# Patient Record
Sex: FEMALE | Race: WHITE | NOT HISPANIC OR LATINO
[De-identification: names, ages, dates, MRNs, and addresses within clinical notes are randomized per-mention and may not be internally consistent; named-entity substitution may affect disease eponyms.]

---

## 2017-08-09 ENCOUNTER — RESULT REVIEW (OUTPATIENT)
Age: 57
End: 2017-08-09

## 2018-10-18 ENCOUNTER — RESULT REVIEW (OUTPATIENT)
Age: 58
End: 2018-10-18

## 2019-12-02 ENCOUNTER — RESULT REVIEW (OUTPATIENT)
Age: 59
End: 2019-12-02

## 2020-02-12 ENCOUNTER — RESULT REVIEW (OUTPATIENT)
Age: 60
End: 2020-02-12

## 2020-11-02 ENCOUNTER — RESULT REVIEW (OUTPATIENT)
Age: 60
End: 2020-11-02

## 2021-12-27 ENCOUNTER — APPOINTMENT (OUTPATIENT)
Dept: ULTRASOUND IMAGING | Facility: CLINIC | Age: 61
End: 2021-12-27
Payer: COMMERCIAL

## 2021-12-27 ENCOUNTER — OUTPATIENT (OUTPATIENT)
Dept: OUTPATIENT SERVICES | Facility: HOSPITAL | Age: 61
LOS: 1 days | End: 2021-12-27
Payer: COMMERCIAL

## 2021-12-27 DIAGNOSIS — Z00.8 ENCOUNTER FOR OTHER GENERAL EXAMINATION: ICD-10-CM

## 2021-12-27 PROCEDURE — 76831 ECHO EXAM UTERUS: CPT | Mod: 26

## 2021-12-27 PROCEDURE — 58340 CATHETER FOR HYSTEROGRAPHY: CPT

## 2021-12-27 PROCEDURE — 76831 ECHO EXAM UTERUS: CPT

## 2022-01-04 ENCOUNTER — APPOINTMENT (OUTPATIENT)
Dept: ULTRASOUND IMAGING | Facility: CLINIC | Age: 62
End: 2022-01-04

## 2022-06-15 ENCOUNTER — RESULT REVIEW (OUTPATIENT)
Age: 62
End: 2022-06-15

## 2022-07-21 ENCOUNTER — APPOINTMENT (OUTPATIENT)
Dept: ULTRASOUND IMAGING | Facility: CLINIC | Age: 62
End: 2022-07-21

## 2022-07-21 PROCEDURE — 76830 TRANSVAGINAL US NON-OB: CPT

## 2022-07-21 PROCEDURE — 76856 US EXAM PELVIC COMPLETE: CPT

## 2022-08-25 ENCOUNTER — APPOINTMENT (OUTPATIENT)
Dept: OBGYN | Facility: CLINIC | Age: 62
End: 2022-08-25

## 2022-08-25 VITALS
SYSTOLIC BLOOD PRESSURE: 98 MMHG | HEIGHT: 63 IN | WEIGHT: 100 LBS | DIASTOLIC BLOOD PRESSURE: 62 MMHG | BODY MASS INDEX: 17.72 KG/M2

## 2022-08-25 PROCEDURE — 99203 OFFICE O/P NEW LOW 30 MIN: CPT

## 2022-08-30 NOTE — PLAN
[FreeTextEntry1] : Discussed the surgical management of endometrial polyps at length with patient.  Risks/benefits and alternatives of operative hysteroscopy and polyp resection were reviewed including but not limited to risk of infection, hemorrhage and perforation with concomittant risks of injury to abdominal organs (bladder, bowel, gyn and vascular structures).  Remote risk of fluid overload reported.   Discussed the need to determine pathology of polyp to r/o malignant potential.\par To schedule d/c operative hysteroscopy.\par During this visit 45 minutes were spent face-to-face with greater than 50% of the time dedicated to counseling.\par

## 2022-08-30 NOTE — HISTORY OF PRESENT ILLNESS
[FreeTextEntry1] : 61yo  PM woman who initially presented to Dr. Reynaga with sporadic episodes of spotting being referred for polypectomy. She thinks that it is related to missing a dose of Prempro. Is sexually active with her  and does not notice bleeding after. Denies dysuria or issues with BM. Has has multiple endometrial biopsies since . Has been followed for this endometrial polyp. She is happy on Prempro and would like to continue. She is unsure if she would like to go through a procedure to have polyp removed.\par \par ObHx: 8 , uncomp\par GynHx: Was recently diagnosed with polyp, denies history\par PMSHx: RA \par HCM: pap smears wnl with Dr. Reynaga,is going to have colonoscopy this year, is up to date on mammogram.\par Meds: Prempro (has been on it for >2 years),Methotrexate for RA\par All: NKDA \par Social: work as psychologist, lives with \par \par TVUS (22):\par FINDINGS:\par Uterus: 6.4 cm x 4.2 cm x 5.0 cm. No fibroids are seen\par Endometrium: 7 mm. There is a polyp measuring up to 9 mm\par \par Right ovary: 1.5 cm x 1.0 cm x 1.3 cm. Within normal limits.\par Left ovary: 1.9 cm x 0.9 cm x 0.8 cm. Within normal limits.\par \par Fluid: None.\par \par IMPRESSION:\par Stable endometrial polyp.\par \par --- End of Report ---\par  [TextBox_4] : Consult regarding cervical polyps [PapSmeardate] : 6/2022 [LMPDate] : menopause 52

## 2023-06-22 ENCOUNTER — EMERGENCY (EMERGENCY)
Facility: HOSPITAL | Age: 63
LOS: 1 days | Discharge: HOME | End: 2023-06-22
Attending: EMERGENCY MEDICINE
Payer: COMMERCIAL

## 2023-06-22 VITALS
WEIGHT: 98.99 LBS | SYSTOLIC BLOOD PRESSURE: 129 MMHG | HEART RATE: 59 BPM | DIASTOLIC BLOOD PRESSURE: 70 MMHG | HEIGHT: 65 IN | TEMPERATURE: 97 F | RESPIRATION RATE: 17 BRPM | OXYGEN SATURATION: 98 %

## 2023-06-22 VITALS
TEMPERATURE: 98 F | DIASTOLIC BLOOD PRESSURE: 52 MMHG | OXYGEN SATURATION: 100 % | HEART RATE: 65 BPM | RESPIRATION RATE: 19 BRPM | SYSTOLIC BLOOD PRESSURE: 102 MMHG

## 2023-06-22 LAB
ALBUMIN SERPL ELPH-MCNC: 4.1 G/DL — SIGNIFICANT CHANGE UP (ref 3.3–5)
ALP SERPL-CCNC: 65 U/L — SIGNIFICANT CHANGE UP (ref 40–120)
ALT FLD-CCNC: 26 U/L — SIGNIFICANT CHANGE UP (ref 10–45)
APTT BLD: 27.6 SEC — SIGNIFICANT CHANGE UP (ref 27.5–35.5)
AST SERPL-CCNC: 31 U/L — SIGNIFICANT CHANGE UP (ref 10–40)
BASOPHILS # BLD AUTO: 0.03 K/UL — SIGNIFICANT CHANGE UP (ref 0–0.2)
BASOPHILS NFR BLD AUTO: 0.5 % — SIGNIFICANT CHANGE UP (ref 0–2)
BILIRUB SERPL-MCNC: 0.4 MG/DL — SIGNIFICANT CHANGE UP (ref 0.2–1.2)
BUN SERPL-MCNC: 16 MG/DL — SIGNIFICANT CHANGE UP (ref 7–23)
CALCIUM SERPL-MCNC: 9.4 MG/DL — SIGNIFICANT CHANGE UP (ref 8.4–10.5)
CHLORIDE SERPL-SCNC: 104 MMOL/L — SIGNIFICANT CHANGE UP (ref 96–108)
CO2 SERPL-SCNC: 19 MMOL/L — LOW (ref 22–31)
CREAT SERPL-MCNC: 0.91 MG/DL — SIGNIFICANT CHANGE UP (ref 0.5–1.3)
EGFR: 71 ML/MIN/1.73M2 — SIGNIFICANT CHANGE UP
EOSINOPHIL # BLD AUTO: 0.06 K/UL — SIGNIFICANT CHANGE UP (ref 0–0.5)
EOSINOPHIL NFR BLD AUTO: 0.9 % — SIGNIFICANT CHANGE UP (ref 0–6)
GLUCOSE SERPL-MCNC: 94 MG/DL — SIGNIFICANT CHANGE UP (ref 70–99)
HCT VFR BLD CALC: 38.7 % — SIGNIFICANT CHANGE UP (ref 34.5–45)
HGB BLD-MCNC: 12 G/DL — SIGNIFICANT CHANGE UP (ref 11.5–15.5)
IMM GRANULOCYTES NFR BLD AUTO: 0.5 % — SIGNIFICANT CHANGE UP (ref 0–0.9)
INR BLD: 0.95 RATIO — SIGNIFICANT CHANGE UP (ref 0.88–1.16)
LYMPHOCYTES # BLD AUTO: 1.52 K/UL — SIGNIFICANT CHANGE UP (ref 1–3.3)
LYMPHOCYTES # BLD AUTO: 23.5 % — SIGNIFICANT CHANGE UP (ref 13–44)
MCHC RBC-ENTMCNC: 28.3 PG — SIGNIFICANT CHANGE UP (ref 27–34)
MCHC RBC-ENTMCNC: 31 GM/DL — LOW (ref 32–36)
MCV RBC AUTO: 91.3 FL — SIGNIFICANT CHANGE UP (ref 80–100)
MONOCYTES # BLD AUTO: 0.43 K/UL — SIGNIFICANT CHANGE UP (ref 0–0.9)
MONOCYTES NFR BLD AUTO: 6.7 % — SIGNIFICANT CHANGE UP (ref 2–14)
NEUTROPHILS # BLD AUTO: 4.39 K/UL — SIGNIFICANT CHANGE UP (ref 1.8–7.4)
NEUTROPHILS NFR BLD AUTO: 67.9 % — SIGNIFICANT CHANGE UP (ref 43–77)
NRBC # BLD: 0 /100 WBCS — SIGNIFICANT CHANGE UP (ref 0–0)
PLATELET # BLD AUTO: 234 K/UL — SIGNIFICANT CHANGE UP (ref 150–400)
POTASSIUM SERPL-MCNC: 4.6 MMOL/L — SIGNIFICANT CHANGE UP (ref 3.5–5.3)
POTASSIUM SERPL-SCNC: 4.6 MMOL/L — SIGNIFICANT CHANGE UP (ref 3.5–5.3)
PROT SERPL-MCNC: 7 G/DL — SIGNIFICANT CHANGE UP (ref 6–8.3)
PROTHROM AB SERPL-ACNC: 10.9 SEC — SIGNIFICANT CHANGE UP (ref 10.5–13.4)
RBC # BLD: 4.24 M/UL — SIGNIFICANT CHANGE UP (ref 3.8–5.2)
RBC # FLD: 15.9 % — HIGH (ref 10.3–14.5)
SODIUM SERPL-SCNC: 134 MMOL/L — LOW (ref 135–145)
TROPONIN T, HIGH SENSITIVITY RESULT: <6 NG/L — SIGNIFICANT CHANGE UP (ref 0–51)
WBC # BLD: 6.46 K/UL — SIGNIFICANT CHANGE UP (ref 3.8–10.5)
WBC # FLD AUTO: 6.46 K/UL — SIGNIFICANT CHANGE UP (ref 3.8–10.5)

## 2023-06-22 PROCEDURE — 82330 ASSAY OF CALCIUM: CPT

## 2023-06-22 PROCEDURE — 84132 ASSAY OF SERUM POTASSIUM: CPT

## 2023-06-22 PROCEDURE — 70498 CT ANGIOGRAPHY NECK: CPT | Mod: 26,MA

## 2023-06-22 PROCEDURE — 70496 CT ANGIOGRAPHY HEAD: CPT | Mod: 26,MA

## 2023-06-22 PROCEDURE — 99291 CRITICAL CARE FIRST HOUR: CPT | Mod: 25

## 2023-06-22 PROCEDURE — 84295 ASSAY OF SERUM SODIUM: CPT

## 2023-06-22 PROCEDURE — 99291 CRITICAL CARE FIRST HOUR: CPT

## 2023-06-22 PROCEDURE — 84484 ASSAY OF TROPONIN QUANT: CPT

## 2023-06-22 PROCEDURE — 85025 COMPLETE CBC W/AUTO DIFF WBC: CPT

## 2023-06-22 PROCEDURE — 82962 GLUCOSE BLOOD TEST: CPT

## 2023-06-22 PROCEDURE — 82803 BLOOD GASES ANY COMBINATION: CPT

## 2023-06-22 PROCEDURE — 85018 HEMOGLOBIN: CPT

## 2023-06-22 PROCEDURE — 70498 CT ANGIOGRAPHY NECK: CPT | Mod: MA

## 2023-06-22 PROCEDURE — 85610 PROTHROMBIN TIME: CPT

## 2023-06-22 PROCEDURE — 82947 ASSAY GLUCOSE BLOOD QUANT: CPT

## 2023-06-22 PROCEDURE — 70450 CT HEAD/BRAIN W/O DYE: CPT | Mod: MA

## 2023-06-22 PROCEDURE — 85014 HEMATOCRIT: CPT

## 2023-06-22 PROCEDURE — 93005 ELECTROCARDIOGRAM TRACING: CPT

## 2023-06-22 PROCEDURE — 70496 CT ANGIOGRAPHY HEAD: CPT | Mod: MA

## 2023-06-22 PROCEDURE — 85730 THROMBOPLASTIN TIME PARTIAL: CPT

## 2023-06-22 PROCEDURE — 83605 ASSAY OF LACTIC ACID: CPT

## 2023-06-22 PROCEDURE — 80053 COMPREHEN METABOLIC PANEL: CPT

## 2023-06-22 PROCEDURE — 82435 ASSAY OF BLOOD CHLORIDE: CPT

## 2023-06-22 NOTE — CONSULT NOTE ADULT - SUBJECTIVE AND OBJECTIVE BOX
Neurology - Consult Note - 06-22-23  -  Jeremias Hernandez MD  PGY-3 Neurology  Spectra: [phone] ([facility]), [phone] ([facility])  -    Name: STEFANIA BRANDT; 63y (1960)    Reason for Admission:     Chief Complaint:     HPI:       Review of Systems:  INCOMPLETE   CONSTITUTIONAL: No fevers or chills  EYES/ENT: No visual changes or no throat pain   NECK: No pain or stiffness  RESPIRATORY: No hemoptysis or shortness of breath  CARDIOVASCULAR: No chest pain or palpitations  GASTROINTESTINAL: No melena or hematochezia  GENITOURINARY: No dysuria or hematuria  NEUROLOGICAL: +As stated in HPI above  SKIN: No itching, burning, rashes, or lesions   All other review of systems is negative unless indicated above.    Allergies:  No Known Allergies      PMHx:     PFHx:     PSuHx:       Medications:  MEDICATIONS  (STANDING):    MEDICATIONS  (PRN):      Vitals:  T(C): 36.7 (06-22-23 @ 13:25), Max: 36.7 (06-22-23 @ 13:25)  HR: 60 (06-22-23 @ 13:25) (59 - 60)  BP: 117/63 (06-22-23 @ 13:25) (117/63 - 129/70)  RR: 20 (06-22-23 @ 13:25) (17 - 20)  SpO2: 100% (06-22-23 @ 13:25) (98% - 100%)    Physical Examination:      Constitutional: well-developed, well-nourished, well-groomed  Eyes: ophthalmoscopic exam deferred secondary to COVID-19 pandemic  Cardiovascular: no swelling, warm-to-touch    Neurological Examination:    - Mental Status: Eyes open, attends to examiner; oriented to person, age, month, year, location, and situation; speech is fluent with intact naming, intact repetition, and follows 1-step and 3-step mid-line crossing commands; good overall fund of knowledge (aware of current events, relevant past history, and vocabulary appropriate for level of education); immediate recall is 3/3 words and delayed recall is 3/3 words at 5 minutes; able to spell WORLD backwards and recite the days of the week in reverse.    - Cranial Nerves:  II: Visual fields are full to confrontation; pupils are equal, round, and reactive to light   III, IV, VI: Extraocular movements are intact without nystagmus  V: Facial sensation is intact in the V1-V3 distribution bilaterally  VII: Face is symmetric with normal eye closure and smile  VIII: Hearing is intact to conversation  IX, X: Uvula is midline and soft palate rises symmetrically  XI: Shoulder shrug intact  XII: Tongue protrudes in the midline    - Motor/Strength Testing:  - No drifts x 4 extremities.                                   Right           Left  Deltoid                     5                 5  Biceps                      5                 5  Triceps                     5                 5  Hand                  5                 5  Hip Flex                   5                  5  Knee Ext	      5                  5  Dorsiflex                  5                  5  Plantarflex               5                  5    - Normal tone throughout.    - Reflexes:   Bicep (C5/C6):                  R 2+ --- L 2+   Brachioradialis (C5/C6) :   R 2+ --- L 2+   Patella (L3/L4) :                 R 3+ --- L 3+ (?2 beats of clonus b/l)  Ankle (S1) :                       R 2+ --- L 2+     - Plant responses neutral on the left, down on the right.    - Sensory: Intact throughout to light touch x 4 extremities.  - Coordination: Finger-nose-finger and heel-shin intact without ataxia or dysmetria.        Labs:                        12.0   6.46  )-----------( 234      ( 22 Jun 2023 12:51 )             38.7     06-22    134<L>  |  104  |  16  ----------------------------<  94  4.6   |  19<L>  |  0.91    Ca    9.4      22 Jun 2023 12:51    TPro  7.0  /  Alb  4.1  /  TBili  0.4  /  DBili  x   /  AST  31  /  ALT  26  /  AlkPhos  65  06-22    CAPILLARY BLOOD GLUCOSE      POCT Blood Glucose.: 96 mg/dL (22 Jun 2023 12:40)    LIVER FUNCTIONS - ( 22 Jun 2023 12:51 )  Alb: 4.1 g/dL / Pro: 7.0 g/dL / ALK PHOS: 65 U/L / ALT: 26 U/L / AST: 31 U/L / GGT: x         PT/INR - ( 22 Jun 2023 12:51 )   PT: 10.9 sec;   INR: 0.95 ratio    PTT - ( 22 Jun 2023 12:51 )  PTT:27.6 sec     Radiology:    CT Brain Stroke Protocol (06.22.23 @ 13:12)   No prior brain imaging is available for comparison.    The fourth, third and lateral ventricles are normal size and position.   There is no hemorrhage, mass or shift of the midline structures. There is   normal gray white matter differentiation. Bone window examination is   unremarkable.      After the intravenous power injection of 70 cc of Omnipaque 300 using a   bolus billie timing run serial thin sections were obtained through the   neck from the thoracic inlet through the intracranial circulation   centered at the [id number] on a multislice CT scanner reformatted   with coronal and sagittal 2 D-MIP projections, including 3 D   reconstructions using a separate 3D [employer] software workstation. A total   of  70 cc of Omnipaque were intravenously injected. 30  cc were discarded  The origins of the carotid and vertebral arteries are normal. The right   bifurcations bilaterally. The vertebral arteries are codominant.     The distal vertebral arteries are well identified as are the   posterior-inferior cerebellar arteries bilaterally. The region of the   vertebral basilar junction is normal. The basilar artery is normal. The   posterior cerebral and superior cerebellar arteries are normal.    Evaluation of the carotid arteries demonstrate normal appearance to the   left distal cervical, petrous, cavernous and supraclinoid arteries. There   is there is mild irregularity with beading of the right cervical internal   carotid artery raising the possibility of fibromuscular dysplasia. There   is no evidence of dissection. The right cavernous, petrous and   supraclinoid internal carotid artery are normal. The anterior cerebral   arteries and anterior communicating arteries and middle cerebral arteries   are normal.        There is no evidence of aneurysm, stenosis, or vessel occlusion.    The normal intracranial venous circulation is identified. The superior   sagittal sinus, internal cerebral veins, vein of Alverto, straight sinus,   transverse sinuses, sigmoid sinuses and internal jugular veins are   normal. Cortical veins are normal.      Regional soft tissues of the neck are unremarkable.      IMPRESSION: Unremarkable brain CT. Mild beading of the distal right   cervical internal carotid artery raising the possibility of fibromuscular   dysplasia without evidence of dissection. Normal intracranial circulation.    Dr. Camarillo discussed these findings with SAVAGE Kaminski on 6/22/2023 1:02 PM   with read back.  Neurology - Consult Note - 06-22-23  -  Jeremias Hernandez MD  PGY-3 Neurology  Spectra: [phone] ([facility]), [phone] ([facility])  -    Name: STEFANIA BRANDT; 63y (1960)    Reason for Admission:     Chief Complaint: "I couldn't get the words out"    HPI: Pt is a 63 y F with no PMH, presenting with sudden onset dysarthria that has since resolved. 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 She realized that she could not speak clearly but managed to say 'give me a moment' to her client; she then put her head down and after a moment, continued the session for another twenty minutes. Patient believes that the difficulty speaking occurred for 1-3 minutes, and she was then able to resume the session but noted that while she could speak, she felt that she could not always speak the word as intended (patient gives example of meaning to say 'this' and instead saying 'that'.) Patient says that this word swapping continued to occur throughout the AM, including in the ED. She also states that since the event she has not been able to 'think right' but could not elaborate further; she has had no lost of consciousness and recalls the event clearly. Pt states that neither of her 2 clients said anything to her about this event either during or after the event. After the session, patient had a 20 min headache which she describes as behind the R eye and with no associated symptoms including n/v, photophobia, or phonophobia; it has since resolved. Patient was brought to ED by family.   Patient admits to not having eaten today, despite exercising and drinking two cups of coffee, and states that the ongoing therapy session was stressful to her, and is unsure if this may have contributed to sx. She has no hx of seizure, stroke, TIA, HTN, smoking; she has a family sister with possible hx of seizure. She denies chest pain, SOB, fever, motor or sensory deficits, changes in vision during the event or interview.      Review of Systems:  CONSTITUTIONAL: No fevers or chills  EYES/ENT: No visual changes or no throat pain   NECK: No pain or stiffness  RESPIRATORY: No hemoptysis or shortness of breath  CARDIOVASCULAR: No chest pain or palpitations  GASTROINTESTINAL: No melena or hematochezia  GENITOURINARY: No dysuria or hematuria  NEUROLOGICAL: +As stated in HPI above  SKIN: No itching, burning, rashes, or lesions   All other review of systems is negative unless indicated above.    Allergies:  No Known Allergies      PMHx:     PFHx:     PSuHx:       Medications:  MEDICATIONS  (STANDING):    MEDICATIONS  (PRN):      Vitals:  T(C): 36.7 (06-22-23 @ 13:25), Max: 36.7 (06-22-23 @ 13:25)  HR: 60 (06-22-23 @ 13:25) (59 - 60)  BP: 117/63 (06-22-23 @ 13:25) (117/63 - 129/70)  RR: 20 (06-22-23 @ 13:25) (17 - 20)  SpO2: 100% (06-22-23 @ 13:25) (98% - 100%)    Physical Examination:      Constitutional: well-developed, well-nourished, well-groomed  Eyes: ophthalmoscopic exam deferred secondary to COVID-19 pandemic  Cardiovascular: no swelling, warm-to-touch    Neurological Examination:    - Mental Status: Eyes open, attends to examiner; oriented to person, age, month, year, location, and situation; speech is fluent with intact naming, intact repetition, and follows 1-step and 3-step mid-line crossing commands; good overall fund of knowledge (aware of current events, relevant past history, and vocabulary appropriate for level of education); immediate recall is 3/3 words and delayed recall is 3/3 words at 5 minutes; able to spell WORLD backwards and recite the days of the week in reverse.    - Cranial Nerves:  II: Visual fields are full to confrontation; pupils are equal, round, and reactive to light   III, IV, VI: Extraocular movements are intact without nystagmus  V: Facial sensation is intact in the V1-V3 distribution bilaterally  VII: Face is symmetric with normal eye closure and smile  VIII: Hearing is intact to conversation  IX, X: Uvula is midline and soft palate rises symmetrically  XI: Shoulder shrug intact  XII: Tongue protrudes in the midline    - Motor/Strength Testing:  - No drifts x 4 extremities.                                   Right           Left  Deltoid                     5                 5  Biceps                      5                 5  Triceps                     5                 5  Hand                  5                 5  Hip Flex                   5                  5  Knee Ext	      5                  5  Dorsiflex                  5                  5  Plantarflex               5                  5    - Normal tone throughout.    - Reflexes:   Bicep (C5/C6):                  R 2+ --- L 2+   Brachioradialis (C5/C6) :   R 2+ --- L 2+   Patella (L3/L4) :                 R 3+ --- L 3+ (?2 beats of clonus b/l)  Ankle (S1) :                       R 2+ --- L 2+     - Plant responses neutral on the left, down on the right.    - Sensory: Intact throughout to light touch x 4 extremities.  - Coordination: Finger-nose-finger and heel-shin intact without ataxia or dysmetria.        Labs:                        12.0   6.46  )-----------( 234      ( 22 Jun 2023 12:51 )             38.7     06-22    134<L>  |  104  |  16  ----------------------------<  94  4.6   |  19<L>  |  0.91    Ca    9.4      22 Jun 2023 12:51    TPro  7.0  /  Alb  4.1  /  TBili  0.4  /  DBili  x   /  AST  31  /  ALT  26  /  AlkPhos  65  06-22    CAPILLARY BLOOD GLUCOSE      POCT Blood Glucose.: 96 mg/dL (22 Jun 2023 12:40)    LIVER FUNCTIONS - ( 22 Jun 2023 12:51 )  Alb: 4.1 g/dL / Pro: 7.0 g/dL / ALK PHOS: 65 U/L / ALT: 26 U/L / AST: 31 U/L / GGT: x         PT/INR - ( 22 Jun 2023 12:51 )   PT: 10.9 sec;   INR: 0.95 ratio    PTT - ( 22 Jun 2023 12:51 )  PTT:27.6 sec     Radiology:    CT Brain Stroke Protocol (06.22.23 @ 13:12)   No prior brain imaging is available for comparison.    The fourth, third and lateral ventricles are normal size and position.   There is no hemorrhage, mass or shift of the midline structures. There is   normal gray white matter differentiation. Bone window examination is   unremarkable.      After the intravenous power injection of 70 cc of Omnipaque 300 using a   bolus billie timing run serial thin sections were obtained through the   neck from the thoracic inlet through the intracranial circulation   centered at the [id number] on a multislice CT scanner reformatted   with coronal and sagittal 2 D-MIP projections, including 3 D   reconstructions using a separate 3D [employer] software workstation. A total   of  70 cc of Omnipaque were intravenously injected. 30  cc were discarded  The origins of the carotid and vertebral arteries are normal. The right   bifurcations bilaterally. The vertebral arteries are codominant.     The distal vertebral arteries are well identified as are the   posterior-inferior cerebellar arteries bilaterally. The region of the   vertebral basilar junction is normal. The basilar artery is normal. The   posterior cerebral and superior cerebellar arteries are normal.    Evaluation of the carotid arteries demonstrate normal appearance to the   left distal cervical, petrous, cavernous and supraclinoid arteries. There   is there is mild irregularity with beading of the right cervical internal   carotid artery raising the possibility of fibromuscular dysplasia. There   is no evidence of dissection. The right cavernous, petrous and   supraclinoid internal carotid artery are normal. The anterior cerebral   arteries and anterior communicating arteries and middle cerebral arteries   are normal.        There is no evidence of aneurysm, stenosis, or vessel occlusion.    The normal intracranial venous circulation is identified. The superior   sagittal sinus, internal cerebral veins, vein of Alverto, straight sinus,   transverse sinuses, sigmoid sinuses and internal jugular veins are   normal. Cortical veins are normal.      Regional soft tissues of the neck are unremarkable.      IMPRESSION: Unremarkable brain CT. Mild beading of the distal right   cervical internal carotid artery raising the possibility of fibromuscular   dysplasia without evidence of dissection. Normal intracranial circulation.    Dr. Camarillo discussed these findings with SAVAGE Kaminski on 6/22/2023 1:02 PM   with read back.  Neurology - Consult Note - 06-22-23  -  Angella Engle  Third Year Medical Student  Spectra: [phone] ([facility]), [phone] ([facility])  -    Name: STEFANIA BRANDT; 63y (1960)    Reason for Admission:     Chief Complaint: "I couldn't get the words out"    HPI:   63-year-old right-handed female with no significant PMHx p/w sudden onset inability to speak with retained awareness lasting for 1-3 minutes w/ subsequent resolution. 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 She realized that she could not speak clearly but managed to say "give me a moment" to her clients; she then put her head down and after a moment, continued the session for another 20 minutes. Patient believes that the difficulty speaking occurred for 1-3 minutes, and she was then able to resume the session but noted that while she could speak, she felt that she could not always speak the word as intended (patient gives example of meaning to say "this" and instead saying "that"). Patient says that this word swapping continued to occur throughout the AM, including in the ED. She also states that since the event she has not been able to "think right" but could not elaborate further; she has had no loss of consciousness and recalls the event clearly. Pt states that neither of her two clients said anything to her about this event either during or after the event. After the session, patient had a 20 min headache which she describes as behind the R eye and with no associated symptoms including n/v, photophobia, or phonophobia; it has since resolved. Patient was brought to ED by family.   Patient admits to not having eaten today, despite exercising and drinking two cups of coffee, and states that the ongoing therapy session was stressful to her, and is unsure if this may have contributed to sx. She has no hx of seizure, stroke, TIA, HTN, smoking; she has a family sister with possible hx of seizure +/- migraine. She denies chest pain, SOB, fever, motor or sensory deficits, changes in vision during the event or interview.      Review of Systems:  CONSTITUTIONAL: No fevers or chills  EYES/ENT: No visual changes or no throat pain   NECK: No pain or stiffness  RESPIRATORY: No hemoptysis or shortness of breath  CARDIOVASCULAR: No chest pain or palpitations  GASTROINTESTINAL: No melena or hematochezia  GENITOURINARY: No dysuria or hematuria  NEUROLOGICAL: +As stated in HPI above  SKIN: No itching, burning, rashes, or lesions   All other review of systems is negative unless indicated above.    Allergies:  No Known Allergies      PMHx:     PFHx:     PSuHx:       Medications:  MEDICATIONS  (STANDING):    MEDICATIONS  (PRN):      Vitals:  T(C): 36.7 (06-22-23 @ 13:25), Max: 36.7 (06-22-23 @ 13:25)  HR: 60 (06-22-23 @ 13:25) (59 - 60)  BP: 117/63 (06-22-23 @ 13:25) (117/63 - 129/70)  RR: 20 (06-22-23 @ 13:25) (17 - 20)  SpO2: 100% (06-22-23 @ 13:25) (98% - 100%)    Physical Examination:      Constitutional: well-developed, well-nourished, well-groomed  Eyes: ophthalmoscopic exam deferred secondary to COVID-19 pandemic  Cardiovascular: no swelling, warm-to-touch    Neurological Examination:    - Mental Status: Eyes open, attends to examiner; oriented to person, age, month, year, location, and situation; speech is fluent with intact naming, intact repetition, and follows 1-step and 3-step mid-line crossing commands; good overall fund of knowledge (aware of current events, relevant past history, and vocabulary appropriate for level of education); immediate recall is 3/3 words and delayed recall is 3/3 words at 5 minutes; able to spell WORLD backwards and recite the days of the week in reverse.    - Cranial Nerves:  II: Visual fields are full to confrontation; pupils are equal, round, and reactive to light   III, IV, VI: Extraocular movements are intact without nystagmus  V: Facial sensation is intact in the V1-V3 distribution bilaterally  VII: Face is symmetric with normal eye closure and smile  VIII: Hearing is intact to conversation  IX, X: Uvula is midline and soft palate rises symmetrically  XI: Shoulder shrug intact  XII: Tongue protrudes in the midline    - Motor/Strength Testing:  - No drifts x 4 extremities.                                   Right           Left  Deltoid                     5                 5  Biceps                      5                 5  Triceps                     5                 5  Hand                  5                 5  Hip Flex                   5                  5  Knee Ext	      5                  5  Dorsiflex                  5                  5  Plantarflex               5                  5    - Normal tone throughout.    - Reflexes:   Bicep (C5/C6):                  R 2+ --- L 2+   Brachioradialis (C5/C6) :   R 2+ --- L 2+   Patella (L3/L4) :                 R 3+ --- L 3+ (?2-3 beats of clonus b/l)  Ankle (S1) :                       R 2+ --- L 2+     - Plant responses neutral on the left, down on the right.    - Sensory: Intact throughout to light touch x 4 extremities.  - Coordination: Finger-nose-finger and heel-shin intact without ataxia or dysmetria.        Labs:                        12.0   6.46  )-----------( 234      ( 22 Jun 2023 12:51 )             38.7     06-22    134<L>  |  104  |  16  ----------------------------<  94  4.6   |  19<L>  |  0.91    Ca    9.4      22 Jun 2023 12:51    TPro  7.0  /  Alb  4.1  /  TBili  0.4  /  DBili  x   /  AST  31  /  ALT  26  /  AlkPhos  65  06-22    CAPILLARY BLOOD GLUCOSE      POCT Blood Glucose.: 96 mg/dL (22 Jun 2023 12:40)    LIVER FUNCTIONS - ( 22 Jun 2023 12:51 )  Alb: 4.1 g/dL / Pro: 7.0 g/dL / ALK PHOS: 65 U/L / ALT: 26 U/L / AST: 31 U/L / GGT: x         PT/INR - ( 22 Jun 2023 12:51 )   PT: 10.9 sec;   INR: 0.95 ratio    PTT - ( 22 Jun 2023 12:51 )  PTT:27.6 sec     Radiology:    CT Brain Stroke Protocol (06.22.23 @ 13:12)   No prior brain imaging is available for comparison.    The fourth, third and lateral ventricles are normal size and position.   There is no hemorrhage, mass or shift of the midline structures. There is   normal gray white matter differentiation. Bone window examination is   unremarkable.      After the intravenous power injection of 70 cc of Omnipaque 300 using a   bolus billie timing run serial thin sections were obtained through the   neck from the thoracic inlet through the intracranial circulation   centered at the [id number]-bq-Bsfnsk on a multislice CT scanner reformatted   with coronal and sagittal 2 D-MIP projections, including 3 D   reconstructions using a separate 3D [employer] software workstation. A total   of  70 cc of Omnipaque were intravenously injected. 30  cc were discarded  The origins of the carotid and vertebral arteries are normal. The right   bifurcations bilaterally. The vertebral arteries are codominant.     The distal vertebral arteries are well identified as are the   posterior-inferior cerebellar arteries bilaterally. The region of the   vertebral basilar junction is normal. The basilar artery is normal. The   posterior cerebral and superior cerebellar arteries are normal.    Evaluation of the carotid arteries demonstrate normal appearance to the   left distal cervical, petrous, cavernous and supraclinoid arteries. There   is there is mild irregularity with beading of the right cervical internal   carotid artery raising the possibility of fibromuscular dysplasia. There   is no evidence of dissection. The right cavernous, petrous and   supraclinoid internal carotid artery are normal. The anterior cerebral   arteries and anterior communicating arteries and middle cerebral arteries   are normal.        There is no evidence of aneurysm, stenosis, or vessel occlusion.    The normal intracranial venous circulation is identified. The superior   sagittal sinus, internal cerebral veins, vein of Alverto, straight sinus,   transverse sinuses, sigmoid sinuses and internal jugular veins are   normal. Cortical veins are normal.      Regional soft tissues of the neck are unremarkable.      IMPRESSION: Unremarkable brain CT. Mild beading of the distal right   cervical internal carotid artery raising the possibility of fibromuscular   dysplasia without evidence of dissection. Normal intracranial circulation.    Dr. Camarillo discussed these findings with SAVAGE Kaminski on 6/22/2023 1:02 PM   with read back.

## 2023-06-22 NOTE — ED PROVIDER NOTE - CLINICAL SUMMARY MEDICAL DECISION MAKING FREE TEXT BOX
Matteo: 64yo with 3 min of aphasia today at 11. now nl. consider tia. called as stroke code. neuro at bedside. Lab studies ordered, independently reviewed and acted on as appropriate. CTs pending.

## 2023-06-22 NOTE — CONSULT NOTE ADULT - ASSESSMENT
INCOMPLETE    Assessment: ***.      mRS: 0  LKN: 10:45 AM 6/22/23  NIHSS: 0    Not a tenecteplase candidate due to non-disabling deficits.  Not a mechanical thrombectomy candidate due to no LVO.    Impression: ***    Recommendations:  Case discussed with outpatient neurologist Dr. Raymond Cabrera. Patient will be seen this afternoon at Dr. Cabrera's office for further workup and management.    -  Angella Engle, MS3 Assessment: Pt is a 63y F with no PMH, presenting due to a 1-3 minute of difficulty speaking this AM. 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 Pt had no loss of consciousness or cognition during this time, though she felt that her mind was 'off', and denies any focal neurological sx. She experienced a R-sided headache for 20 min which has now resolved. Physical exam, including Neuro and NIH exam, is unremarkable besides for a patellar hyperreflexia; CT head and angio are negative for hemorrhage and LVO.     mRS: 0  LKN: 10:45 AM 6/22/23  NIHSS: 0    Not a tenecteplase candidate due to non-disabling deficits.  Not a mechanical thrombectomy candidate due to no LVO.    Impression: Patient with transient difficulty in articulating speech for 1-3 minutes with no associated symptoms or neurological deficits. Workup including physical and neurological exam as well as CTH stroke protocol negative for stroke findings. Low suspicion for stroke; possible etiologies include TIA as well as non-neurologic causes.     Recommendations:  Case discussed with outpatient neurologist Dr. Raymond Cabrera. Patient will be seen this afternoon at Dr. Cabrera's office for further workup and management.    -  Angella Engle, MS3 Assessment: 63-year-old right-handed female w/ no significant PMHx presenting d/t a 1-3 minute of difficulty speaking this AM. 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 Pt had no loss of consciousness or cognition during this time, though she felt that her mind was "off," and denies any focal neurological sx. She experienced a R-sided headache for 20 min which has now resolved. Neurological examination is benign w/ exception of patellar hyperreflexia (3+ vs 4+, non-lateralizing); CTH, CTA H/N: no acute findings.    mRS: 0  LKN: 10:45 AM 6/22/23  NIHSS: 0    Not a tenecteplase candidate due to non-disabling deficits.  Not a mechanical thrombectomy candidate due to no LVO.    Impression: Transient difficulty in articulating speech for 1-3 minutes with no associated symptoms or neurological deficits during the event, followed by 20 minute episode of R sided headache w/o other migrainous features. Workup including physical and neurological exam as well as CTH stroke protocol negative for stroke findings. Low suspicion for stroke. Possible etiologies include non-neurological etiologies, less likely transient ischemic attack, and less likely focal seizure w/ retained awareness.    Recommendations:  Case discussed with outpatient neurologist Dr. Raymond Cabrera. Patient will be seen this afternoon at Dr. Cabrera's office for further workup and management.    -  Angella Engle  Third Year Medical Student

## 2023-06-22 NOTE — ED ADULT NURSE NOTE - NSFALLUNIVINTERV_ED_ALL_ED
Bed/Stretcher in lowest position, wheels locked, appropriate side rails in place/Call bell, personal items and telephone in reach/Instruct patient to call for assistance before getting out of bed/chair/stretcher/Non-slip footwear applied when patient is off stretcher/[facility] to call system/Physically safe environment - no spills, clutter or unnecessary equipment/Purposeful proactive rounding/Room/bathroom lighting operational, light cord in reach

## 2023-06-22 NOTE — ED PROVIDER NOTE - PHYSICAL EXAMINATION
GENERAL: Awake, alert, NAD  HEENT: NC/AT, moist mucous membranes, PERRL, EOMI  ABDOMEN: Soft, non tender, non distended, no rebound, no guarding  EXT: No edema, no calf tenderness, 2+ DP pulses bilaterally, no deformities.  NEURO: alert, CN 2-12 intact,, sensation intact throughout, coordination shows no abnormalities , finger to nose accomplished b/l,  romberg negative, motor 5/5 RUE/LUE/RLE/LLE/EHL/Plantar flexion,   SKIN: Warm and dry. No rash.  PSYCH: Normal affect.

## 2023-06-22 NOTE — ED PROVIDER NOTE - NSFOLLOWUPINSTRUCTIONS_ED_ALL_ED_FT
Please follow-up with Dr. Velasquez with your appointment today.    An outline of a person's head showing the brain, highlighting the speech (language) centers of the brain.  Aphasia is a language disorder. It affects the part of the brain that is used to communicate. Aphasia does not affect intelligence, but a person may have trouble:  Speaking.  Understanding speech.  Reading.  Writing.  Some people with aphasia may also have trouble with memory or attention. Aphasia can happen to anyone at any age. It is most common in older adults.    Dr Cabrera   [phone] [address] [facility] [address], [place] [address]    What are the causes?  This condition is caused by damage to the language centers of the brain. Damage may be caused by:  Stroke. This causes reduced blood flow to certain areas of the brain. Stroke is the most common cause of aphasia.  Traumatic brain injury (TBI).  Brain tumor.  Infection of the brain tissues.  Nervous system disease that gradually gets worse, such as dementia or multiple sclerosis (MS). This is called progressive neurological disorder.  Brain surgery.  What are the signs or symptoms?  Symptoms of this condition include:  Trouble finding the right words or expressing thoughts and needs through speech.  Using the wrong words, nonsense words, or jargon.  Talking in sentences that do not make sense or that are not grammatically correct.  Being unable to repeat back words and phrases.  Difficulty expressing ideas through writing or not understanding what you are reading.  Being unable to understand other people's speech.  Having trouble understanding numbers.  The condition affects people differently. Symptoms may start suddenly or come on gradually, depending on the underlying cause.    How is this diagnosed?  This condition may be diagnosed based on a screening of your ability to communicate as soon as symptoms start, or when you are medically stable after a stroke or brain injury. Later, a more comprehensive assessment may be done in the hospital or at a rehabilitation center. The assessment may test your ability to:  Use speech to communicate your needs.  Use muscles in your mouth and throat for speaking and swallowing.  Express ideas with speech or other means of communication, such as hand gestures.  Make conversation with others across a variety of topics.  Hear and understand speech.  Understand and produce written material.  Manage memory and attention associated with communication.  How is this treated?  Treatment for this condition depends on your needs and abilities. The goal is to help restore your ability to communicate or find ways to manage communication challenges. Common treatments include:  Speech–language therapy. Part of this may include:  Rebuilding intonation, sentence structure, and vocabulary.  Learning other ways to communicate, such as using word books, communication boards, or special software programs.  Learning to communicate with writing, sign language, or hand gestures.  Working with family members. This may include:  Learning ways to communicate.  Emotional support.  Occupational therapy. This can help to find devices to assist with daily living.  Treatment usually begins as soon as possible. It may begin while you are in the hospital and continue in a rehabilitation center or at home. In some cases, aphasia may improve quickly on its own. In other cases, recovery occurs more slowly over time.    Follow these instructions at home:  An outline of a person writing on a notepad.  Make sure you have a good support system at home.  Find a support group. This can help you connect with others who are going through the same thing.  Try the following tips while communicating:  Use short, simple sentences. Ask family members to do the same. Sentences that require one-word or short answers are easiest.  Avoid distractions like background noise when trying to listen or talk.  Try communicating with gestures, pointing, writing, or drawing.  Talk slowly. Ask family members to talk to you slowly.  Maintain eye contact when communicating. Ask family members to do the same when communicating with you.  Ask family members to give you time to respond or to engage in conversation with them.  Keep all follow-up visits.  Where to find support  National Aphasia Association: www.aphasia.org  Contact a health care provider if:  Your symptoms change or get worse.  You are struggling with anxiety or depression.  Get help right away if:  You have any symptoms of a stroke. "BE FAST" is an easy way to remember the main warning signs of a stroke:  B - Balance. Signs are dizziness, sudden trouble walking, or loss of balance.  E - Eyes. Signs are trouble seeing or a sudden change in vision.  F - Face. Signs are sudden weakness or numbness of the face, or the face or eyelid drooping on one side.  A - Arms. Signs are weakness or numbness in an arm. This happens suddenly and usually on one side of the body.  S - Speech. Signs are sudden trouble speaking, slurred speech, or trouble understanding what people say.  T - Time. Time to call emergency services. Write down what time symptoms started.  You have other signs of a stroke, such as:  A sudden, severe headache with no known cause.  Nausea or vomiting.  Seizure.  These symptoms may be an emergency. Get help right away. Call 911.  Do not wait to see if the symptoms will go away.  Do not drive yourself to the hospital.  Summary  Aphasia is a language disorder. It may cause difficulty with speech, writing, reading, or understanding others.  In some cases, aphasia may improve quickly on its own. In other cases, recovery occurs more slowly over time.  Get help right away if you have symptoms of a stroke.  This information is not intended to replace advice given to you by your health care provider. Make sure you discuss any questions you have with your health care provider.

## 2023-06-22 NOTE — ED ADULT NURSE NOTE - OBJECTIVE STATEMENT
64 y/o F with PMH of arthritis, on methotrexate presents to ED complaining of difficulty speaking. Pt is a therapist and at 1100, pt had an episode of 3 minutes of aphasia. Pt took one aspirin and called EMS. Upon arrival, code stroke was initiated. Pt brought to CT scan. Neuro MD and ED MD at bedside. Pt is A&Ox4, no slurred speech or aphasia. Has full strength and aphasia in all extremities. Pt ambulates without difficulty.

## 2023-06-22 NOTE — CONSULT NOTE ADULT - NSCONSULTADDITIONALINFOA_GEN_ALL_CORE
patieint was discharged prior to attending evaluation.   neurology team discussed case with patients outpatient neurologist

## 2023-06-22 NOTE — ED PROVIDER NOTE - PROGRESS NOTE DETAILS
[place] PGY 2 Patient has appointment with Dr. Velasquez now will discharge with rapid referral to neurology.  Neurology cleared patient and spoke to provider at desk station

## 2023-06-22 NOTE — ED PROVIDER NOTE - PATIENT PORTAL LINK FT
You can access the FollowMyHealth Patient Portal offered by [facility] by registering at the following website: [email or web address]. By joining [employer]’s FollowMyHealth portal, you will also be able to view your health information using other applications (apps) compatible with our system.

## 2023-06-22 NOTE — ED PROVIDER NOTE - OBJECTIVE STATEMENT
63-year-old female without any significant past medical additions chief complaint of transient aphasia.  Approximate 11:00 patient had 3 minutes of not being able to speak.  Went away on its own no prior stroke.  Currently has no complaints no abdominal pain no nausea no vomiting.  Is not currently in any pain.  Code stroke called when patient arrived to the ED

## 2023-12-14 ENCOUNTER — APPOINTMENT (OUTPATIENT)
Dept: ULTRASOUND IMAGING | Facility: CLINIC | Age: 63
End: 2023-12-14
Payer: COMMERCIAL

## 2023-12-14 ENCOUNTER — APPOINTMENT (OUTPATIENT)
Dept: RADIOLOGY | Facility: CLINIC | Age: 63
End: 2023-12-14
Payer: COMMERCIAL

## 2023-12-14 ENCOUNTER — APPOINTMENT (OUTPATIENT)
Dept: MAMMOGRAPHY | Facility: CLINIC | Age: 63
End: 2023-12-14
Payer: COMMERCIAL

## 2023-12-14 PROCEDURE — 76856 US EXAM PELVIC COMPLETE: CPT

## 2023-12-14 PROCEDURE — 76641 ULTRASOUND BREAST COMPLETE: CPT | Mod: 50

## 2023-12-14 PROCEDURE — 77067 SCR MAMMO BI INCL CAD: CPT

## 2023-12-14 PROCEDURE — 77080 DXA BONE DENSITY AXIAL: CPT

## 2023-12-14 PROCEDURE — 77063 BREAST TOMOSYNTHESIS BI: CPT

## 2023-12-14 PROCEDURE — 76830 TRANSVAGINAL US NON-OB: CPT

## 2024-04-22 NOTE — ED PROVIDER NOTE - EKG ADDITIONAL QUESTION - PERFORMED INDEPENDENT VISUALIZATION
Prepped with ChloraPrep, a minimum of 3 minute dry time, longer if needed, no pooling noted, patient draped in sterile fashion. Mid to lower back   Prone Yes

## 2024-06-25 ENCOUNTER — APPOINTMENT (OUTPATIENT)
Dept: ULTRASOUND IMAGING | Facility: CLINIC | Age: 64
End: 2024-06-25

## 2024-06-25 PROCEDURE — 76856 US EXAM PELVIC COMPLETE: CPT

## 2024-06-25 PROCEDURE — 76830 TRANSVAGINAL US NON-OB: CPT

## 2024-12-23 ENCOUNTER — OUTPATIENT (OUTPATIENT)
Dept: OUTPATIENT SERVICES | Facility: HOSPITAL | Age: 64
LOS: 1 days | End: 2024-12-23
Payer: COMMERCIAL

## 2024-12-23 ENCOUNTER — APPOINTMENT (OUTPATIENT)
Dept: MAMMOGRAPHY | Facility: IMAGING CENTER | Age: 64
End: 2024-12-23
Payer: COMMERCIAL

## 2024-12-23 DIAGNOSIS — Z00.8 ENCOUNTER FOR OTHER GENERAL EXAMINATION: ICD-10-CM

## 2024-12-23 PROCEDURE — 77063 BREAST TOMOSYNTHESIS BI: CPT | Mod: 26

## 2024-12-23 PROCEDURE — 77063 BREAST TOMOSYNTHESIS BI: CPT

## 2024-12-23 PROCEDURE — 77067 SCR MAMMO BI INCL CAD: CPT

## 2024-12-23 PROCEDURE — 77067 SCR MAMMO BI INCL CAD: CPT | Mod: 26

## 2025-08-26 ENCOUNTER — APPOINTMENT (OUTPATIENT)
Dept: ULTRASOUND IMAGING | Facility: CLINIC | Age: 65
End: 2025-08-26
Payer: COMMERCIAL

## 2025-08-26 PROCEDURE — 76856 US EXAM PELVIC COMPLETE: CPT

## 2025-08-26 PROCEDURE — 76830 TRANSVAGINAL US NON-OB: CPT

## 2025-09-05 ENCOUNTER — APPOINTMENT (OUTPATIENT)
Dept: OPHTHALMOLOGY | Facility: CLINIC | Age: 65
End: 2025-09-05

## 2025-09-09 ENCOUNTER — TRANSCRIPTION ENCOUNTER (OUTPATIENT)
Age: 65
End: 2025-09-09